# Patient Record
(demographics unavailable — no encounter records)

---

## 2025-01-16 NOTE — PHYSICAL EXAM
[Restricted in physically strenuous activity but ambulatory and able to carry out work of a light or sedentary nature] : Status 1- Restricted in physically strenuous activity but ambulatory and able to carry out work of a light or sedentary nature, e.g., light house work, office work [Normal] : affect appropriate [de-identified] : No wheezing, rhonchi

## 2025-01-16 NOTE — ASSESSMENT
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 margins positive. P16 focal patchy staining Pathological stage [id number]. Invasive carcinoma measures up to 12 mm in greatest dimension s/p chemoradiation with Mitomycin and Capecitabine (9/26/22 - 10/31/23) - total of 6 weeks  Completed radiation on 11/6/2023 MRI abdomen (12/26/23): No evidence of metastatic disease MRI pelvis (12/2023): No evidence of disease CT Chest (12/2023): [facility]STOPHER MRI pelvis (5/221/24):  No residual or recurrent anal tumor. Stage: yT0yN0 s/p  colonoscopy with Dr Sandhu (6/18/24): Non-bleeding internal hemorrhoids were found during retroflexion. There is no endoscopic evidence of inflammation, mass or polyps in the entire colon. The digital rectal exam was normal. Pertinent negatives include no anal lesion or abnormality 12/2024 CT C/A/P - [facility]STOPHER but circumferential urinary bladder wall thickening. Scans reviewed and discussed  He has no  symptoms or pelvic pain - advised to f/u with urology if he started to have any  symptoms continue to f/u with Dr. Matute (colorectal surgery) -Labs are drawn in the office, reviewed, analyzed, and discussed continue to monitor.  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 Chen  rtc in 6 months CBC, CMP, ferritin, CEA

## 2025-01-16 NOTE — ASSESSMENT
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 margins positive. P16 focal patchy staining Pathological stage [id number]. Invasive carcinoma measures up to 12 mm in greatest dimension s/p chemoradiation with Mitomycin and Capecitabine (9/26/22 - 10/31/23) - total of 6 weeks  Completed radiation on 11/6/2023 MRI abdomen (12/26/23): No evidence of metastatic disease MRI pelvis (12/2023): No evidence of disease CT Chest (12/2023): [facility] MRI pelvis (5/221/24):  No residual or recurrent anal tumor. Stage: yT0yN0 s/p  colonoscopy with Dr Sandhu (6/18/24): Non-bleeding internal hemorrhoids were found during retroflexion. There is no endoscopic evidence of inflammation, mass or polyps in the entire colon. The digital rectal exam was normal. Pertinent negatives include no anal lesion or abnormality 12/2024 CT C/A/P - [facility]STOPHER but circumferential urinary bladder wall thickening. Scans reviewed and discussed  He has no  symptoms or pelvic pain - advised to f/u with urology if he started to have any  symptoms continue to f/u with Dr. Matute (colorectal surgery) -Labs are drawn in the office, reviewed, analyzed, and discussed continue to monitor.  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 Chen  rtc in 6 months CBC, CMP, ferritin, CEA

## 2025-01-16 NOTE — HISTORY OF PRESENT ILLNESS
[de-identified] : Mr. Andrey Rivera is 67 years old male with anal cancer here for consultation, referred by Dr. Jesenia Matute Accompanied by his wife Reynaldo. Overall feels well  Patient with hx of HTN, CAD, and MI s/p 3 stents  (25 yrs ago) who started to feel a growth on his anus 3 months ago (no pain, rectal bleeding, or changes in his BMs).   Colonoscopy 2012  with Dr. Sandhu - Diverticulosis and internal hemorrhoids Colonoscopy 2018 -  recommend to have another one in 10 years  He saw Dr Ashli Osuna and Dr Victor at [facility]  He apparently had sigmoidoscopy with Dr Osuna who felt something ~2.5 cm x 2.5 cm. Patient nad his wife are not aware what he said later about the lesion He and his wife were not happy with his care at [facility] nad decided to stick with [facility]  He has seen Dr Mazariegos (rad onc) who has referred him to Dr Cabrera for the DECREASE Trial. Patient reports that he is not interested in the trial as he does not want to compromise on his treatment  8/14/2023  s/p EUA, anoscopy, excision of left perianal mass LEFT PERIANAL SKIN LESION, LONG STITCH CHOE LATERAL MARGIN, SHORT STITCH MARKS MEDIAL MARGIN: - Invasive squamous cell carcinoma with in situ component. - Invasive carcinoma  present at deep and lateral resection margins including 12 o'clock margin. - 6 o'clock margin is positive for carcinoma in situ. - See comment.  MRI pelvis (8/31/23) No anal tumor or  adenopathy.  CT C/A/P (9/1/23) No evidence of metastatic disease within the abdomen or pelvis. Multiple bilateral renal cysts. A 1 cm contour deforming lesion of the lateral aspect of the left kidney has density readings greater than that of a simple cyst. It is nonspecific but may represent a complex cyst. Further evaluation/follow-up studies are recommended.  FHx: Mother with breast cancer in her 60s Father with pancreatic cancer in his 80s.   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 2 weeks ago he apparently "turned corner" He did have CT chest at [facility] results of which are not available He has started to gain weight Still has cough but much better  [de-identified] : Patient is seen today for follow up s/p chemoradiation with Mitomycin and Capecitabine (9/26/22 - 10/31/23) - total of 6 weeks  Completed radiation on 11/6/2023  He's doing well, taking THC and smokes 1 cig here and there.  [FreeTextEntry1] : Mr. 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   Mr. Rivera initially presented to Dr. Song (his PCP) complaining of a new anal growth x3 months and was referred to Dr. Matute in June.  On 8/14/2023, Mr. Rivera underwent an anoscopy with excision and biopsy of the perianal skin with Dr. Matute. Pathology demonstrated: invasive squamous cell carcinoma that was present at the deep and lateral resection margins including 12:00 margin; there was an in situ component  that was present at the 6:00 margin.   On 8/31 Mr. Rivera underwent an MRI of the pelvis that revealed no anal tumor or adenopathy. On 9/1 he had a CT C/A/P that showed no evidence of metastatic disease within the chest, abdomen, or pelvis; multiple bilateral renal cysts. A 1 cm contour deforming lesion of the lateral aspect of the left kidney that had density readings greater than that of a simple cyst.   Mr. Rivera was treated with definitive chemoradiation, he completed 28 fractions to a total dose of 5040 cGy to the pelvis/anus on 11/6/23.   Follow-up imaging: MRI ABDOMEN WAW IC (12/26/23) revealed No evidence of metastatic disease to the abdomen. CT Chest (12/18/23) was negative.   Mr. Rivera is present for his routine follow-up status post 9 months completion.  He denies changes in his urinary or bowel habits at this time. Voiding freely.  BM daily.  Weight stable He has follow-up by Dr. Chen today.  He had colonoscopy in June at [facility].  PELV RECTAL ANAL ONLY WAWIC (5/29/24)  No residual or recurrent anal tumor.  Stage:  yT0  yN0.

## 2025-01-16 NOTE — BEGINNING OF VISIT
[0] : 2) Feeling down, depressed, or hopeless: Not at all (0) [PHQ-2 Negative] : PHQ-2 Negative [Pain Scale: ___] : On a scale of 1-10, today the patient's pain is a(n) [unfilled]. [Current] : Current [Date Discussed (MM/DD/YY): ___] : Discussed: [unfilled] [Reviewed, no changes] : Reviewed, no changes [Abdominal Pain] : no abdominal pain [Vomiting] : no vomiting [Constipation] : no constipation [Diarrhea Character] : Diarrhea: Grade 0 [de-identified] : smokes 1 cig here and there

## 2025-01-16 NOTE — DISEASE MANAGEMENT
[Pathological] : TNM Stage: p [I] : I [FreeTextEntry4] : Squamous Cell Carcinoma of the Anus  [TTNM] : 1 [NTNM] : X [MTNM] : X

## 2025-01-16 NOTE — HISTORY OF PRESENT ILLNESS
[de-identified] : Mr. Andrey Rivera is 67 years old male with anal cancer here for consultation, referred by Dr. Jesenia Matute Accompanied by his wife Reynaldo. Overall feels well  Patient with hx of HTN, CAD, and MI s/p 3 stents  (25 yrs ago) who started to feel a growth on his anus 3 months ago (no pain, rectal bleeding, or changes in his BMs).   Colonoscopy 2012  with Dr. Sandhu - Diverticulosis and internal hemorrhoids Colonoscopy 2018 -  recommend to have another one in 10 years  He saw Dr Ashli Osuna and Dr Victor at [facility]  He apparently had sigmoidoscopy with Dr Osuna who felt something ~2.5 cm x 2.5 cm. Patient nad his wife are not aware what he said later about the lesion He and his wife were not happy with his care at [facility] nad decided to stick with [facility]  He has seen Dr Mazariegos (rad onc) who has referred him to Dr Cabrera for the DECREASE Trial. Patient reports that he is not interested in the trial as he does not want to compromise on his treatment  8/14/2023  s/p EUA, anoscopy, excision of left perianal mass LEFT PERIANAL SKIN LESION, LONG STITCH CHOE LATERAL MARGIN, SHORT STITCH MARKS MEDIAL MARGIN: - Invasive squamous cell carcinoma with in situ component. - Invasive carcinoma  present at deep and lateral resection margins including 12 o'clock margin. - 6 o'clock margin is positive for carcinoma in situ. - See comment.  MRI pelvis (8/31/23) No anal tumor or  adenopathy.  CT C/A/P (9/1/23) No evidence of metastatic disease within the abdomen or pelvis. Multiple bilateral renal cysts. A 1 cm contour deforming lesion of the lateral aspect of the left kidney has density readings greater than that of a simple cyst. It is nonspecific but may represent a complex cyst. Further evaluation/follow-up studies are recommended.  FHx: Mother with breast cancer in her 60s Father with pancreatic cancer in his 80s.   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 2 weeks ago he apparently "turned corner" He did have CT chest at [facility] results of which are not available He has started to gain weight Still has cough but much better  [de-identified] : Patient is seen today for follow up s/p chemoradiation with Mitomycin and Capecitabine (9/26/22 - 10/31/23) - total of 6 weeks  Completed radiation on 11/6/2023  He's doing well, taking THC and smokes 1 cig here and there.  [FreeTextEntry1] : Mr. 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   Mr. Rivera initially presented to Dr. Song (his PCP) complaining of a new anal growth x3 months and was referred to Dr. Matute in June.  On 8/14/2023, Mr. Rivera underwent an anoscopy with excision and biopsy of the perianal skin with Dr. Matute. Pathology demonstrated: invasive squamous cell carcinoma that was present at the deep and lateral resection margins including 12:00 margin; there was an in situ component  that was present at the 6:00 margin.   On 8/31 Mr. Rivera underwent an MRI of the pelvis that revealed no anal tumor or adenopathy. On 9/1 he had a CT C/A/P that showed no evidence of metastatic disease within the chest, abdomen, or pelvis; multiple bilateral renal cysts. A 1 cm contour deforming lesion of the lateral aspect of the left kidney that had density readings greater than that of a simple cyst.   Mr. Rivera was treated with definitive chemoradiation, he completed 28 fractions to a total dose of 5040 cGy to the pelvis/anus on 11/6/23.   Follow-up imaging: MRI ABDOMEN WAW IC (12/26/23) revealed No evidence of metastatic disease to the abdomen. CT Chest (12/18/23) was negative.   Mr. Rivera is present for his routine follow-up status post 9 months completion.  He denies changes in his urinary or bowel habits at this time. Voiding freely.  BM daily.  Weight stable He has follow-up by Dr. Chen today.  He had colonoscopy in June at [facility].  PELV RECTAL ANAL ONLY WAWIC (5/29/24)  No residual or recurrent anal tumor.  Stage:  yT0  yN0.

## 2025-01-16 NOTE — PHYSICAL EXAM
[Restricted in physically strenuous activity but ambulatory and able to carry out work of a light or sedentary nature] : Status 1- Restricted in physically strenuous activity but ambulatory and able to carry out work of a light or sedentary nature, e.g., light house work, office work [Normal] : affect appropriate [de-identified] : No wheezing, rhonchi

## 2025-01-16 NOTE — PHYSICAL EXAM
[Restricted in physically strenuous activity but ambulatory and able to carry out work of a light or sedentary nature] : Status 1- Restricted in physically strenuous activity but ambulatory and able to carry out work of a light or sedentary nature, e.g., light house work, office work [Normal] : affect appropriate [de-identified] : No wheezing, rhonchi

## 2025-01-16 NOTE — HISTORY OF PRESENT ILLNESS
[de-identified] : Mr. Andrey Rivera is 67 years old male with anal cancer here for consultation, referred by Dr. Jesenia Matute Accompanied by his wife Reynaldo. Overall feels well  Patient with hx of HTN, CAD, and MI s/p 3 stents  (25 yrs ago) who started to feel a growth on his anus 3 months ago (no pain, rectal bleeding, or changes in his BMs).   Colonoscopy 2012  with Dr. Sandhu - Diverticulosis and internal hemorrhoids Colonoscopy 2018 -  recommend to have another one in 10 years  He saw Dr Ashli Osuna and Dr Victor at [facility] [place]  He apparently had sigmoidoscopy with Dr Osuna who felt something ~2.5 cm x 2.5 cm. Patient nad his wife are not aware what he said later about the lesion He and his wife were not happy with his care at [facility] nad decided to stick with [facility]  He has seen Dr Mazariegos (rad onc) who has referred him to Dr Cabrrea for the DECREASE Trial. Patient reports that he is not interested in the trial as he does not want to compromise on his treatment  8/14/2023  s/p EUA, anoscopy, excision of left perianal mass LEFT PERIANAL SKIN LESION, LONG STITCH CHOE LATERAL MARGIN, SHORT STITCH MARKS MEDIAL MARGIN: - Invasive squamous cell carcinoma with in situ component. - Invasive carcinoma  present at deep and lateral resection margins including 12 o'clock margin. - 6 o'clock margin is positive for carcinoma in situ. - See comment.  MRI pelvis (8/31/23) No anal tumor or  adenopathy.  CT C/A/P (9/1/23) No evidence of metastatic disease within the abdomen or pelvis. Multiple bilateral renal cysts. A 1 cm contour deforming lesion of the lateral aspect of the left kidney has density readings greater than that of a simple cyst. It is nonspecific but may represent a complex cyst. Further evaluation/follow-up studies are recommended.  FHx: Mother with breast cancer in her 60s Father with pancreatic cancer in his 80s.   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 2 weeks ago he apparently "turned corner" He did have CT chest at [facility] results of which are not available He has started to gain weight Still has cough but much better  [de-identified] : Patient is seen today for follow up s/p chemoradiation with Mitomycin and Capecitabine (9/26/22 - 10/31/23) - total of 6 weeks  Completed radiation on 11/6/2023  He's doing well, taking THC and smokes 1 cig here and there.  [FreeTextEntry1] : Mr. 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   Mr. Rivera initially presented to Dr. Song (his PCP) complaining of a new anal growth x3 months and was referred to Dr. Matute in June.  On 8/14/2023, Mr. Rivera underwent an anoscopy with excision and biopsy of the perianal skin with Dr. Matute. Pathology demonstrated: invasive squamous cell carcinoma that was present at the deep and lateral resection margins including 12:00 margin; there was an in situ component  that was present at the 6:00 margin.   On 8/31 Mr. Rivera underwent an MRI of the pelvis that revealed no anal tumor or adenopathy. On 9/1 he had a CT C/A/P that showed no evidence of metastatic disease within the chest, abdomen, or pelvis; multiple bilateral renal cysts. A 1 cm contour deforming lesion of the lateral aspect of the left kidney that had density readings greater than that of a simple cyst.   Mr. Rivera was treated with definitive chemoradiation, he completed 28 fractions to a total dose of 5040 cGy to the pelvis/anus on 11/6/23.   Follow-up imaging: MRI ABDOMEN WAW IC (12/26/23) revealed No evidence of metastatic disease to the abdomen. CT Chest (12/18/23) was negative.   Mr. Rivera is present for his routine follow-up status post 9 months completion.  He denies changes in his urinary or bowel habits at this time. Voiding freely.  BM daily.  Weight stable He has follow-up by Dr. Chen today.  He had colonoscopy in June at [facility].  PELV RECTAL ANAL ONLY WAWIC (5/29/24)  No residual or recurrent anal tumor.  Stage:  yT0  yN0.

## 2025-01-16 NOTE — ASSESSMENT
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 margins positive. P16 focal patchy staining Pathological stage [id number]. Invasive carcinoma measures up to 12 mm in greatest dimension s/p chemoradiation with Mitomycin and Capecitabine (9/26/22 - 10/31/23) - total of 6 weeks  Completed radiation on 11/6/2023 MRI abdomen (12/26/23): No evidence of metastatic disease MRI pelvis (12/2023): No evidence of disease CT Chest (12/2023): CHRISTOPHER MRI pelvis (5/221/24):  No residual or recurrent anal tumor. Stage: yT0yN0 s/p  colonoscopy with Dr Sandhu (6/18/24): Non-bleeding internal hemorrhoids were found during retroflexion. There is no endoscopic evidence of inflammation, mass or polyps in the entire colon. The digital rectal exam was normal. Pertinent negatives include no anal lesion or abnormality 12/2024 CT C/A/P - CHRISTOPHER but circumferential urinary bladder wall thickening. Scans reviewed and discussed  He has no  symptoms or pelvic pain - advised to f/u with urology if he started to have any  symptoms continue to f/u with Dr. Matute (colorectal surgery) -Labs are drawn in the office, reviewed, analyzed, and discussed continue to monitor.  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 Chen  rtc in 6 months CBC, CMP, ferritin, CEA

## 2025-01-16 NOTE — BEGINNING OF VISIT
[0] : 2) Feeling down, depressed, or hopeless: Not at all (0) [PHQ-2 Negative] : PHQ-2 Negative [Pain Scale: ___] : On a scale of 1-10, today the patient's pain is a(n) [unfilled]. [Current] : Current [Date Discussed (MM/DD/YY): ___] : Discussed: [unfilled] [Reviewed, no changes] : Reviewed, no changes [Abdominal Pain] : no abdominal pain [Vomiting] : no vomiting [Constipation] : no constipation [Diarrhea Character] : Diarrhea: Grade 0 [de-identified] : smokes 1 cig here and there

## 2025-01-16 NOTE — BEGINNING OF VISIT
[0] : 2) Feeling down, depressed, or hopeless: Not at all (0) [PHQ-2 Negative] : PHQ-2 Negative [Pain Scale: ___] : On a scale of 1-10, today the patient's pain is a(n) [unfilled]. [Current] : Current [Date Discussed (MM/DD/YY): ___] : Discussed: [unfilled] [Reviewed, no changes] : Reviewed, no changes [Abdominal Pain] : no abdominal pain [Vomiting] : no vomiting [Constipation] : no constipation [Diarrhea Character] : Diarrhea: Grade 0 [de-identified] : smokes 1 cig here and there

## 2025-01-16 NOTE — BEGINNING OF VISIT
[0] : 2) Feeling down, depressed, or hopeless: Not at all (0) [PHQ-2 Negative] : PHQ-2 Negative [Pain Scale: ___] : On a scale of 1-10, today the patient's pain is a(n) [unfilled]. [Current] : Current [Date Discussed (MM/DD/YY): ___] : Discussed: [unfilled] [Reviewed, no changes] : Reviewed, no changes [Abdominal Pain] : no abdominal pain [Vomiting] : no vomiting [Constipation] : no constipation [Diarrhea Character] : Diarrhea: Grade 0 [de-identified] : smokes 1 cig here and there

## 2025-01-16 NOTE — PHYSICAL EXAM
[Restricted in physically strenuous activity but ambulatory and able to carry out work of a light or sedentary nature] : Status 1- Restricted in physically strenuous activity but ambulatory and able to carry out work of a light or sedentary nature, e.g., light house work, office work [Normal] : affect appropriate [de-identified] : No wheezing, rhonchi

## 2025-01-16 NOTE — HISTORY OF PRESENT ILLNESS
[de-identified] : Mr. Andrey Rivera is 67 years old male with anal cancer here for consultation, referred by Dr. Jesenia Matute Accompanied by his wife Reynaldo. Overall feels well  Patient with hx of HTN, CAD, and MI s/p 3 stents  (25 yrs ago) who started to feel a growth on his anus 3 months ago (no pain, rectal bleeding, or changes in his BMs).   Colonoscopy 2012  with Dr. Sandhu - Diverticulosis and internal hemorrhoids Colonoscopy 2018 -  recommend to have another one in 10 years  He saw Dr Ashli Osuna and Dr Victor at [facility]  He apparently had sigmoidoscopy with Dr Osuna who felt something ~2.5 cm x 2.5 cm. Patient nad his wife are not aware what he said later about the lesion He and his wife were not happy with his care at [facility] nad decided to stick with [facility]  He has seen Dr Mazariegos (rad onc) who has referred him to Dr Cabrera for the DECREASE Trial. Patient reports that he is not interested in the trial as he does not want to compromise on his treatment  8/14/2023  s/p EUA, anoscopy, excision of left perianal mass LEFT PERIANAL SKIN LESION, LONG STITCH CHOE LATERAL MARGIN, SHORT STITCH MARKS MEDIAL MARGIN: - Invasive squamous cell carcinoma with in situ component. - Invasive carcinoma  present at deep and lateral resection margins including 12 o'clock margin. - 6 o'clock margin is positive for carcinoma in situ. - See comment.  MRI pelvis (8/31/23) No anal tumor or  adenopathy.  CT C/A/P (9/1/23) No evidence of metastatic disease within the abdomen or pelvis. Multiple bilateral renal cysts. A 1 cm contour deforming lesion of the lateral aspect of the left kidney has density readings greater than that of a simple cyst. It is nonspecific but may represent a complex cyst. Further evaluation/follow-up studies are recommended.  FHx: Mother with breast cancer in her 60s Father with pancreatic cancer in his 80s.   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 (Dr Huddleston)- was diagnosed with bronchitis and treated with high dose steroids and Abx. 2 weeks ago he apparently "turned corner" He did have CT chest at [facility] results of which are not available He has started to gain weight Still has cough but much better  [de-identified] : Patient is seen today for follow up s/p chemoradiation with Mitomycin and Capecitabine (9/26/22 - 10/31/23) - total of 6 weeks  Completed radiation on 11/6/2023  He's doing well, taking THC and smokes 1 cig here and there.  [FreeTextEntry1] : Mr. 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   Mr. Rivera initially presented to Dr. Song (his PCP) complaining of a new anal growth x3 months and was referred to Dr. Matute in June.  On 8/14/2023, Mr. Rivera underwent an anoscopy with excision and biopsy of the perianal skin with Dr. Matute. Pathology demonstrated: invasive squamous cell carcinoma that was present at the deep and lateral resection margins including 12:00 margin; there was an in situ component  that was present at the 6:00 margin.   On 8/31 Mr. Rivera underwent an MRI of the pelvis that revealed no anal tumor or adenopathy. On 9/1 he had a CT C/A/P that showed no evidence of metastatic disease within the chest, abdomen, or pelvis; multiple bilateral renal cysts. A 1 cm contour deforming lesion of the lateral aspect of the left kidney that had density readings greater than that of a simple cyst.   Mr. Rivera was treated with definitive chemoradiation, he completed 28 fractions to a total dose of 5040 cGy to the pelvis/anus on 11/6/23.   Follow-up imaging: MRI ABDOMEN WAW IC (12/26/23) revealed No evidence of metastatic disease to the abdomen. CT Chest (12/18/23) was negative.   Mr. Rivera is present for his routine follow-up status post 9 months completion.  He denies changes in his urinary or bowel habits at this time. Voiding freely.  BM daily.  Weight stable He has follow-up by Dr. Chen today.  He had colonoscopy in June at [facility].  PELV RECTAL ANAL ONLY WAWIC (5/29/24)  No residual or recurrent anal tumor.  Stage:  yT0  yN0.

## 2025-01-16 NOTE — ASSESSMENT
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 margins positive. P16 focal patchy staining Pathological stage [id number]. Invasive carcinoma measures up to 12 mm in greatest dimension s/p chemoradiation with Mitomycin and Capecitabine (9/26/22 - 10/31/23) - total of 6 weeks  Completed radiation on 11/6/2023 MRI abdomen (12/26/23): No evidence of metastatic disease MRI pelvis (12/2023): No evidence of disease CT Chest (12/2023): CHRISTOPHER MRI pelvis (5/221/24):  No residual or recurrent anal tumor. Stage: yT0yN0 s/p  colonoscopy with Dr Sandhu (6/18/24): Non-bleeding internal hemorrhoids were found during retroflexion. There is no endoscopic evidence of inflammation, mass or polyps in the entire colon. The digital rectal exam was normal. Pertinent negatives include no anal lesion or abnormality 12/2024 CT C/A/P - CHRISTOPHER but circumferential urinary bladder wall thickening. Scans reviewed and discussed  He has no  symptoms or pelvic pain - advised to f/u with urology if he started to have any  symptoms continue to f/u with Dr. Matute (colorectal surgery) -Labs are drawn in the office, reviewed, analyzed, and discussed continue to monitor.  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 Chen  rtc in 6 months CBC, CMP, ferritin, CEA

## 2025-07-17 NOTE — PHYSICAL EXAM
[Restricted in physically strenuous activity but ambulatory and able to carry out work of a light or sedentary nature] : Status 1- Restricted in physically strenuous activity but ambulatory and able to carry out work of a light or sedentary nature, e.g., light house work, office work [Normal] : affect appropriate [de-identified] : No wheezing, rhonchi

## 2025-07-17 NOTE — PHYSICAL EXAM
[Restricted in physically strenuous activity but ambulatory and able to carry out work of a light or sedentary nature] : Status 1- Restricted in physically strenuous activity but ambulatory and able to carry out work of a light or sedentary nature, e.g., light house work, office work [Normal] : affect appropriate [de-identified] : No wheezing, rhonchi

## 2025-07-24 NOTE — HISTORY OF PRESENT ILLNESS
[de-identified] : Mr. Andrey Rivera is 67 years old male with anal cancer here for consultation, referred by Dr. Jesenia Matute Accompanied by his wife Reynaldo. Overall feels well  Patient with hx of HTN, CAD, and MI s/p 3 stents  (25 yrs ago) who started to feel a growth on his anus 3 months ago (no pain, rectal bleeding, or changes in his BMs).   Colonoscopy 2012  with Dr. Sandhu - Diverticulosis and internal hemorrhoids Colonoscopy 2018 -  recommend to have another one in 10 years  He saw Dr Ashli Osuna and Dr Victor at [facility]  He apparently had sigmoidoscopy with Dr Osuna who felt something ~2.5 cm x 2.5 cm. Patient nad his wife are not aware what he said later about the lesion He and his wife were not happy with his care at [facility] nad decided to stick with [facility]  He has seen Dr Mazariegos (rad onc) who has referred him to Dr Cabrera for the DECREASE Trial. Patient reports that he is not interested in the trial as he does not want to compromise on his treatment  8/14/2023  s/p EUA, anoscopy, excision of left perianal mass LEFT PERIANAL SKIN LESION, LONG STITCH CHOE LATERAL MARGIN, SHORT STITCH MARKS MEDIAL MARGIN: - Invasive squamous cell carcinoma with in situ component. - Invasive carcinoma  present at deep and lateral resection margins including 12 o'clock margin. - 6 o'clock margin is positive for carcinoma in situ. - See comment.  MRI pelvis (8/31/23) No anal tumor or  adenopathy.  CT C/A/P (9/1/23) No evidence of metastatic disease within the abdomen or pelvis. Multiple bilateral renal cysts. A 1 cm contour deforming lesion of the lateral aspect of the left kidney has density readings greater than that of a simple cyst. It is nonspecific but may represent a complex cyst. Further evaluation/follow-up studies are recommended.  FHx: Mother with breast cancer in her 60s Father with pancreatic cancer in his 80s.   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 2 weeks ago he apparently "turned corner" He did have CT chest at [facility] results of which are not available He has started to gain weight Still has cough but much better  [de-identified] : Patient is seen today for follow up s/p chemoradiation with Mitomycin and Capecitabine (9/26/23 - 10/31/23) - total of 6 weeks  Completed radiation on 11/6/2023  Feels good overall No pain with defecation, dysuria, hematuria Stopped thc and cigarettes x 8 months  [FreeTextEntry1] : Mr. 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   Mr. Rivera initially presented to Dr. Song (his PCP) complaining of a new anal growth x3 months and was referred to Dr. Matute in June.  On 8/14/2023, Mr. Rivera underwent an anoscopy with excision and biopsy of the perianal skin with Dr. Matute. Pathology demonstrated: invasive squamous cell carcinoma that was present at the deep and lateral resection margins including 12:00 margin; there was an in situ component  that was present at the 6:00 margin.   On 8/31 Mr. Rivera underwent an MRI of the pelvis that revealed no anal tumor or adenopathy. On 9/1 he had a CT C/A/P that showed no evidence of metastatic disease within the chest, abdomen, or pelvis; multiple bilateral renal cysts. A 1 cm contour deforming lesion of the lateral aspect of the left kidney that had density readings greater than that of a simple cyst.   Mr. Rivera was treated with definitive chemoradiation, he completed 28 fractions to a total dose of 5040 cGy to the pelvis/anus on 11/6/23.   Follow-up imaging: MRI ABDOMEN WAW IC (12/26/23) revealed No evidence of metastatic disease to the abdomen. CT Chest (12/18/23) was negative.   Mr. Rivera is present for his routine follow-up status post 9 months completion.  He denies changes in his urinary or bowel habits at this time. Voiding freely.  BM daily.  Weight stable He has follow-up by Dr. Chen today.  He had colonoscopy in June at [facility].  PELV RECTAL ANAL ONLY [facility]AWIC (5/29/24)  No residual or recurrent anal tumor.  Stage:  yT0  yN0.

## 2025-07-24 NOTE — HISTORY OF PRESENT ILLNESS
Medication re-fill has been sent to [facility] to see if she can get for cheaper price ----- Message from Kati Carlson sent at 3/21/2022 10:45 AM CDT -----  Contact: [phone]/self  Who Called: PT    Regarding:  speak with dr regarding mediation     Would the patient rather a call back or a response via [facility]? Call back    Best Call Back Number: [phone]    Additional Information: n/a         [de-identified] : Mr. Andrey Rivera is 67 years old male with anal cancer here for consultation, referred by Dr. Jesenia Matute Accompanied by his wife Reynaldo. Overall feels well  Patient with hx of HTN, CAD, and MI s/p 3 stents  (25 yrs ago) who started to feel a growth on his anus 3 months ago (no pain, rectal bleeding, or changes in his BMs).   Colonoscopy 2012  with Dr. Sandhu - Diverticulosis and internal hemorrhoids Colonoscopy 2018 -  recommend to have another one in 10 years  He saw Dr Ashli Osuna and Dr Victor at [facility]  He apparently had sigmoidoscopy with Dr Osuna who felt something ~2.5 cm x 2.5 cm. Patient nad his wife are not aware what he said later about the lesion He and his wife were not happy with his care at [facility] nad decided to stick with [facility]  He has seen Dr Mazariegos (rad onc) who has referred him to Dr Cabrera for the DECREASE Trial. Patient reports that he is not interested in the trial as he does not want to compromise on his treatment  8/14/2023  s/p EUA, anoscopy, excision of left perianal mass LEFT PERIANAL SKIN LESION, LONG STITCH CHOE LATERAL MARGIN, SHORT STITCH MARKS MEDIAL MARGIN: - Invasive squamous cell carcinoma with in situ component. - Invasive carcinoma  present at deep and lateral resection margins including 12 o'clock margin. - 6 o'clock margin is positive for carcinoma in situ. - See comment.  MRI pelvis (8/31/23) No anal tumor or  adenopathy.  CT C/A/P (9/1/23) No evidence of metastatic disease within the abdomen or pelvis. Multiple bilateral renal cysts. A 1 cm contour deforming lesion of the lateral aspect of the left kidney has density readings greater than that of a simple cyst. It is nonspecific but may represent a complex cyst. Further evaluation/follow-up studies are recommended.  FHx: Mother with breast cancer in her 60s Father with pancreatic cancer in his 80s.   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 2 weeks ago he apparently "turned corner" He did have CT chest at [facility] results of which are not available He has started to gain weight Still has cough but much better  [de-identified] : Patient is seen today for follow up s/p chemoradiation with Mitomycin and Capecitabine (9/26/23 - 10/31/23) - total of 6 weeks  Completed radiation on 11/6/2023  Feels good overall No pain with defecation, dysuria, hematuria Stopped thc and cigarettes x 8 months  [FreeTextEntry1] : Mr. 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   Mr. Rivera initially presented to Dr. Song (his PCP) complaining of a new anal growth x3 months and was referred to Dr. Matute in June.  On 8/14/2023, Mr. Rivera underwent an anoscopy with excision and biopsy of the perianal skin with Dr. Matute. Pathology demonstrated: invasive squamous cell carcinoma that was present at the deep and lateral resection margins including 12:00 margin; there was an in situ component  that was present at the 6:00 margin.   On 8/31 Mr. Rivera underwent an MRI of the pelvis that revealed no anal tumor or adenopathy. On 9/1 he had a CT C/A/P that showed no evidence of metastatic disease within the chest, abdomen, or pelvis; multiple bilateral renal cysts. A 1 cm contour deforming lesion of the lateral aspect of the left kidney that had density readings greater than that of a simple cyst.   Mr. Rivera was treated with definitive chemoradiation, he completed 28 fractions to a total dose of 5040 cGy to the pelvis/anus on 11/6/23.   Follow-up imaging: MRI ABDOMEN WAW IC (12/26/23) revealed No evidence of metastatic disease to the abdomen. CT Chest (12/18/23) was negative.   Mr. Rivera is present for his routine follow-up status post 9 months completion.  He denies changes in his urinary or bowel habits at this time. Voiding freely.  BM daily.  Weight stable He has follow-up by Dr. Chen today.  He had colonoscopy in June at [facility].  PELV RECTAL ANAL ONLY WAWIC (5/29/24)  No residual or recurrent anal tumor.  Stage:  yT0  yN0.

## 2025-07-24 NOTE — ASSESSMENT
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 margins positive. P16 focal patchy staining Pathological stage [id number]. Invasive carcinoma measures up to 12 mm in greatest dimension s/p chemoradiation with Mitomycin and Capecitabine (9/26/23 - 10/31/23) - total of 6 weeks  Completed radiation on 11/6/2023 MRI abdomen (12/26/23): No evidence of metastatic disease MRI pelvis (12/2023): No evidence of disease CT Chest (12/2023): CHRISTOPHER MRI pelvis (5/221/24):  No residual or recurrent anal tumor. Stage: yT0yN0 s/p colonoscopy with Dr Sandhu (6/18/24): Non-bleeding internal hemorrhoids were found during retroflexion. There is no endoscopic evidence of inflammation, mass or polyps in the entire colon. The digital rectal exam was normal. Pertinent negatives include no anal lesion or abnormality 12/2024 CT C/A/P - CHRISTOPHER but circumferential urinary bladder wall thickening.  Patient is seen today for follow up Feels good overall He has no GI/ symptoms or pelvic pain  Advised to f/u with Dr. Matute (colorectal surgery) Labs ordered, drawn in the office, reviewed, analyzed and discussed Continue to monitor. 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

## 2025-07-24 NOTE — ASSESSMENT
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 margins positive. P16 focal patchy staining Pathological stage [id number]. Invasive carcinoma measures up to 12 mm in greatest dimension s/p chemoradiation with Mitomycin and Capecitabine (9/26/23 - 10/31/23) - total of 6 weeks  Completed radiation on 11/6/2023 MRI abdomen (12/26/23): No evidence of metastatic disease MRI pelvis (12/2023): No evidence of disease CT Chest (12/2023): CHRISTOPHER MRI pelvis (5/221/24):  No residual or recurrent anal tumor. Stage: yT0yN0 s/p colonoscopy with Dr Sandhu (6/18/24): Non-bleeding internal hemorrhoids were found during retroflexion. There is no endoscopic evidence of inflammation, mass or polyps in the entire colon. The digital rectal exam was normal. Pertinent negatives include no anal lesion or abnormality 12/2024 CT C/A/P - CHRISTOPHER but circumferential urinary bladder wall thickening.  Patient is seen today for follow up Feels good overall He has no GI/ symptoms or pelvic pain  Advised to f/u with Dr. Matute (colorectal surgery) Labs ordered, drawn in the office, reviewed, analyzed and discussed Continue to monitor. 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